# Patient Record
Sex: FEMALE | Race: WHITE | Employment: OTHER | URBAN - METROPOLITAN AREA
[De-identification: names, ages, dates, MRNs, and addresses within clinical notes are randomized per-mention and may not be internally consistent; named-entity substitution may affect disease eponyms.]

---

## 2019-05-06 ENCOUNTER — HOSPITAL ENCOUNTER (EMERGENCY)
Facility: CLINIC | Age: 59
Discharge: HOME | End: 2019-05-06
Attending: EMERGENCY MEDICINE | Admitting: EMERGENCY MEDICINE
Payer: MEDICARE

## 2019-05-06 VITALS
TEMPERATURE: 98.1 F | RESPIRATION RATE: 16 BRPM | OXYGEN SATURATION: 95 % | DIASTOLIC BLOOD PRESSURE: 76 MMHG | SYSTOLIC BLOOD PRESSURE: 123 MMHG | WEIGHT: 207 LBS | HEART RATE: 93 BPM

## 2019-05-06 DIAGNOSIS — R45.851 SUICIDAL IDEATION: ICD-10-CM

## 2019-05-06 DIAGNOSIS — E86.0 DEHYDRATION: ICD-10-CM

## 2019-05-06 LAB
ANION GAP SERPL CALCULATED.3IONS-SCNC: 10 MMOL/L (ref 3–14)
APAP SERPL-MCNC: <2 MG/L (ref 10–20)
BASOPHILS # BLD AUTO: 0 10E9/L (ref 0–0.2)
BASOPHILS NFR BLD AUTO: 0.2 %
BUN SERPL-MCNC: 14 MG/DL (ref 7–30)
CALCIUM SERPL-MCNC: 9.6 MG/DL (ref 8.5–10.1)
CHLORIDE SERPL-SCNC: 106 MMOL/L (ref 94–109)
CO2 SERPL-SCNC: 24 MMOL/L (ref 20–32)
CREAT SERPL-MCNC: 0.93 MG/DL (ref 0.52–1.04)
DIFFERENTIAL METHOD BLD: NORMAL
EOSINOPHIL # BLD AUTO: 0 10E9/L (ref 0–0.7)
EOSINOPHIL NFR BLD AUTO: 0.6 %
ERYTHROCYTE [DISTWIDTH] IN BLOOD BY AUTOMATED COUNT: 14.7 % (ref 10–15)
GFR SERPL CREATININE-BSD FRML MDRD: 67 ML/MIN/{1.73_M2}
GLUCOSE SERPL-MCNC: 116 MG/DL (ref 70–99)
HCT VFR BLD AUTO: 35.7 % (ref 35–47)
HGB BLD-MCNC: 11.8 G/DL (ref 11.7–15.7)
IMM GRANULOCYTES # BLD: 0 10E9/L (ref 0–0.4)
IMM GRANULOCYTES NFR BLD: 0.3 %
LYMPHOCYTES # BLD AUTO: 1.8 10E9/L (ref 0.8–5.3)
LYMPHOCYTES NFR BLD AUTO: 27 %
MCH RBC QN AUTO: 28.6 PG (ref 26.5–33)
MCHC RBC AUTO-ENTMCNC: 33.1 G/DL (ref 31.5–36.5)
MCV RBC AUTO: 86 FL (ref 78–100)
MONOCYTES # BLD AUTO: 0.4 10E9/L (ref 0–1.3)
MONOCYTES NFR BLD AUTO: 6.7 %
NEUTROPHILS # BLD AUTO: 4.3 10E9/L (ref 1.6–8.3)
NEUTROPHILS NFR BLD AUTO: 65.2 %
NRBC # BLD AUTO: 0 10*3/UL
NRBC BLD AUTO-RTO: 0 /100
PLATELET # BLD AUTO: 230 10E9/L (ref 150–450)
POTASSIUM SERPL-SCNC: 3.8 MMOL/L (ref 3.4–5.3)
RBC # BLD AUTO: 4.13 10E12/L (ref 3.8–5.2)
SODIUM SERPL-SCNC: 140 MMOL/L (ref 133–144)
WBC # BLD AUTO: 6.5 10E9/L (ref 4–11)

## 2019-05-06 PROCEDURE — 80329 ANALGESICS NON-OPIOID 1 OR 2: CPT | Performed by: EMERGENCY MEDICINE

## 2019-05-06 PROCEDURE — 85025 COMPLETE CBC W/AUTO DIFF WBC: CPT | Performed by: EMERGENCY MEDICINE

## 2019-05-06 PROCEDURE — 90791 PSYCH DIAGNOSTIC EVALUATION: CPT

## 2019-05-06 PROCEDURE — 96360 HYDRATION IV INFUSION INIT: CPT

## 2019-05-06 PROCEDURE — 99285 EMERGENCY DEPT VISIT HI MDM: CPT | Mod: 25

## 2019-05-06 PROCEDURE — 80048 BASIC METABOLIC PNL TOTAL CA: CPT | Performed by: EMERGENCY MEDICINE

## 2019-05-06 PROCEDURE — 25000128 H RX IP 250 OP 636: Performed by: EMERGENCY MEDICINE

## 2019-05-06 RX ADMIN — SODIUM CHLORIDE 1000 ML: 9 INJECTION, SOLUTION INTRAVENOUS at 18:24

## 2019-05-06 ASSESSMENT — ENCOUNTER SYMPTOMS
NAUSEA: 0
DIARRHEA: 1
FEVER: 0
VOMITING: 0

## 2019-05-06 NOTE — ED TRIAGE NOTES
Having thoughts of wanting to  a parking lot to be hit by a car. Crying a lot per group home staff.

## 2019-05-06 NOTE — ED AVS SNAPSHOT
Emergency Department  [address] [place] [id number]  Phone:  [phone]  Fax:  [phone]                                    Sherron Mock   MRN: [id number]    Department:   Emergency Department   Date of Visit:  5/6/2019           After Visit Summary Signature Page    I have received my discharge instructions, and my questions have been answered. I have discussed any challenges I see with this plan with the nurse or doctor.    ..........................................................................................................................................  Patient/Patient Representative Signature      ..........................................................................................................................................  Patient Representative Print Name and Relationship to Patient    ..................................................               ................................................  Date                                   Time    ..........................................................................................................................................  Reviewed by Signature/Title    ...................................................              ..............................................  Date                                               Time          22EPIC Rev 08/18

## 2019-05-06 NOTE — ED PROVIDER NOTES
"  History     Chief Complaint:  Suicidal    HPI   Sherron Mock is a 59 year old female who presents with suicidal ideation. The staff member at the patient's group home states that the patient was started on a new medication on April 19 and a few days later she was having thoughts of stabbing herself with a knife. The staff states the patient was taken off of this medication about 4 days later by her psychiatrist. The patient has not been started on a new medication since that time. However, the staff member was with the patient tonight and states that the patient was stating she did not feel like herself. She stated she was scared and she did not know why. The staff member and sister than called her psychiatrist's nurse, and after discussion the patient was sent to the ED. Her in the ED the patient states she feels like she would like to go into a parking lot and have a car run her over. However, the patient states she would never act on this thought as she \"has a lot of living to do as she is only 59.\" The patient states she feels safe going home tonight. The patient notes more diarrhea yesterday and today. However, her sister states the patient has had more episodes of diarrhea over the past several months when she eats certain foods like hamburgers. The patient denies any recent nausea, vomiting, or vomiting. The patient does note that she has been feeling dry and not taking enough water lately. The patient also notes chronic swelling in her right leg that her sister states has been worked up during a previous hospital stay several years ago.     Allergies:  Ativan     Medications:    Medications reviewed. No current medications.     Past Medical History:    Medical history reviewed. No pertinent medical history.    Past Surgical History:    Surgical history reviewed. No pertinent surgical history.    Family History:    Family history reviewed. No pertinent family history.     Social History:  The patient was " accompanied to the ED by staff member and sister.  Lives in group home     Review of Systems   Constitutional: Negative for fever.   Gastrointestinal: Positive for diarrhea. Negative for nausea and vomiting.   Psychiatric/Behavioral: Positive for suicidal ideas.   All other systems reviewed and are negative.    Physical Exam     Patient Vitals for the past 24 hrs:   BP Temp Temp src Pulse Heart Rate Resp SpO2 Weight   05/06/19 1945 123/76 -- -- 93 -- -- 95 % --   05/06/19 1930 -- -- -- 99 -- -- 93 % --   05/06/19 1845 -- -- -- -- -- -- 97 % --   05/06/19 1830 -- -- -- -- -- -- 95 % --   05/06/19 1730 -- -- -- -- -- -- 94 % --   05/06/19 1727 -- -- -- -- -- -- 96 % --   05/06/19 1717 (!) 166/100 98.1  F (36.7  C) Oral -- 125 16 97 % 93.9 kg (207 lb)     Physical Exam  Nursing note and vitals reviewed.  General: Oriented to person, place, and time. Appears well-developed and well-nourished. Baseline speech difficulties  Head: No signs of trauma.   Mouth/Throat: Oropharynx is clear and moist.   Eyes: Conjunctivae are normal. Pupils are equal, round, and reactive to light.   Neck: Normal range of motion. No nuchal rigidity.   Cardiovascular: Tachycardic and regular rhythm.    Respiratory: Effort normal and breath sounds normal. No respiratory distress.   Abdominal: Soft. There is no tenderness. There is no guarding.   Musculoskeletal: Normal range of motion. Right lower extremity edema.   Neurological: The patient is alert and oriented to person, place, and time.  PERRLA, EOMI, visual fields intact, strength in upper/lower extremities normal and symmetrical.   Sensation normal. Speech normal  GCS eye subscore is 4. GCS verbal subscore is 5. GCS motor subscore is 6.   Skin: Skin is warm and dry. No rash noted.   Psychiatric: normal mood and affect. behavior is normal. Suicidal thoughts with plan but is able to contract for safety.   Emergency Department Course   Laboratory:  Laboratory findings were communicated with the  patient, staff, and sister who voiced understanding of the findings.    CBC: WBC 6.5, HGB 11.8,   BMP: glucose 116(H) o/w WNL (Creatinine 0.93)  Acetaminophen level: <2    Interventions:  1824 NS 1000 mL IV    Emergency Department Course:  Nursing notes and vitals reviewed.    1730 The patient is being assessed by DEC will follow up after DEC assessment.     1749 I spoke with DEC regarding the patient's presentation and plan of care. She states the patient has had suicidal thoughts for a long time. She states several weeks ago the patient wanted to cut herself with a knife, so her knives were taken away. She states the patient has had recent medication changes and has been appearing more sad recently. The patient would like to go home. She does not like her therapist.     1804 I performed an exam of the patient as documented above.     1814 I spoke with DEC regarding the patient's presentation and plan of care.    1825 IV was inserted and blood was drawn for laboratory testing, results above.    2017 I personally reviewed the laboratory results with the patient, staff, and sister and answered all related questions prior to discharge.    Impression & Plan      Medical Decision Making:  Sherron Mock is a 59 year old female who presents to the ED with suicidal ideation as described above. The patient has a history of suicidal ideation and treatment with her psychiatrist has involved different types of medications. She clinically appears somewhat dehydrated. Labs were drawn. She has no signs of an acute ingestion. She was given a liter of normal saline and is now feeling better. She was evaluated by our DEC social worked and will be encouraged to follow up with her psychiatrist as an outpatient. The patient will contract for safety. There is no acute need for admission or a 72 hour hold.     Diagnosis:    ICD-10-CM    1. Suicidal ideation R45.851 Acetaminophen level   2. Dehydration E86.0         Disposition:    The patient is discharged to home.    Discharge Medications:  No discharge medication.     Scribe Disclosure:  I, Carmen Cabreraion, am serving as a scribe at 5:14 PM on 5/6/2019 to document services personally performed by Boris Albarado MD based on my observations and the provider's statements to me.   EMERGENCY DEPARTMENT       Boris Albarado MD  05/07/19 0051